# Patient Record
Sex: FEMALE | URBAN - METROPOLITAN AREA
[De-identification: names, ages, dates, MRNs, and addresses within clinical notes are randomized per-mention and may not be internally consistent; named-entity substitution may affect disease eponyms.]

---

## 2019-05-02 ENCOUNTER — APPOINTMENT (RX ONLY)
Dept: URBAN - METROPOLITAN AREA OTHER 12 | Facility: OTHER | Age: 64
Setting detail: DERMATOLOGY
End: 2019-05-02

## 2019-05-02 DIAGNOSIS — D22 MELANOCYTIC NEVI: ICD-10-CM

## 2019-05-02 DIAGNOSIS — L85.3 XEROSIS CUTIS: ICD-10-CM

## 2019-05-02 DIAGNOSIS — L57.0 ACTINIC KERATOSIS: ICD-10-CM

## 2019-05-02 PROBLEM — D22.5 MELANOCYTIC NEVI OF TRUNK: Status: ACTIVE | Noted: 2019-05-02

## 2019-05-02 PROCEDURE — 99213 OFFICE O/P EST LOW 20 MIN: CPT | Mod: 25

## 2019-05-02 PROCEDURE — ? LIQUID NITROGEN

## 2019-05-02 PROCEDURE — 17003 DESTRUCT PREMALG LES 2-14: CPT

## 2019-05-02 PROCEDURE — ? ADDITIONAL NOTES

## 2019-05-02 PROCEDURE — 17000 DESTRUCT PREMALG LESION: CPT

## 2019-05-02 PROCEDURE — ? COUNSELING

## 2019-05-02 ASSESSMENT — LOCATION SIMPLE DESCRIPTION DERM
LOCATION SIMPLE: UPPER BACK
LOCATION SIMPLE: ABDOMEN
LOCATION SIMPLE: LEFT FOREARM
LOCATION SIMPLE: LEFT CHEEK

## 2019-05-02 ASSESSMENT — LOCATION ZONE DERM
LOCATION ZONE: TRUNK
LOCATION ZONE: ARM
LOCATION ZONE: FACE

## 2019-05-02 ASSESSMENT — LOCATION DETAILED DESCRIPTION DERM
LOCATION DETAILED: EPIGASTRIC SKIN
LOCATION DETAILED: LEFT PROXIMAL DORSAL FOREARM
LOCATION DETAILED: INFERIOR THORACIC SPINE
LOCATION DETAILED: LEFT CENTRAL MALAR CHEEK

## 2019-05-02 NOTE — PROCEDURE: ADDITIONAL NOTES
Additional Notes: Patient has an appointment with TRAVIS Menard next week and will check thyroid
Detail Level: Simple

## 2019-05-02 NOTE — PROCEDURE: LIQUID NITROGEN
Duration Of Freeze Thaw-Cycle (Seconds): 0
Post-Care Instructions: I reviewed with the patient in detail post-care instructions. Patient is to wear sunprotection, and avoid picking at any of the treated lesions. Pt may apply Vaseline to crusted or scabbing areas.
Render Note In Bullet Format When Appropriate: No
Detail Level: Simple
Consent: The patient's consent was obtained including but not limited to risks of crusting, scabbing, blistering, scarring, darker or lighter pigmentary change, recurrence, incomplete removal and infection.

## 2022-09-20 ENCOUNTER — OFFICE VISIT (OUTPATIENT)
Dept: URBAN - METROPOLITAN AREA CLINIC 24 | Facility: CLINIC | Age: 67
End: 2022-09-20
Payer: MEDICARE

## 2022-09-20 DIAGNOSIS — H43.811 VITREOUS DEGENERATION, RIGHT EYE: ICD-10-CM

## 2022-09-20 DIAGNOSIS — H25.813 COMBINED FORMS OF AGE-RELATED CATARACT, BILATERAL: Primary | ICD-10-CM

## 2022-09-20 PROCEDURE — 99204 OFFICE O/P NEW MOD 45 MIN: CPT | Performed by: OPTOMETRIST

## 2022-09-20 PROCEDURE — 92134 CPTRZ OPH DX IMG PST SGM RTA: CPT | Performed by: OPTOMETRIST

## 2022-09-20 ASSESSMENT — VISUAL ACUITY
OD: 20/20
OS: 20/20

## 2022-09-20 ASSESSMENT — INTRAOCULAR PRESSURE
OD: 12
OS: 12

## 2022-09-20 ASSESSMENT — KERATOMETRY
OD: 43.19
OS: 43.03

## 2022-09-20 NOTE — IMPRESSION/PLAN
Impression: Combined forms of age-related cataract, bilateral: H25.813. Plan: Cataract accounts for patient's complaint. Discussed treatment options. Surgical treatment is recommended. Surgical risk and benefits discussed. Patient elects surgical treatment, OU OD first.  Pt is a candidate for multifocal, toric, standard, LenSx and ORA. AIM: Alexandra Paredes -- pt has never worn DRx
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